# Patient Record
Sex: MALE | Race: WHITE | NOT HISPANIC OR LATINO | URBAN - METROPOLITAN AREA
[De-identification: names, ages, dates, MRNs, and addresses within clinical notes are randomized per-mention and may not be internally consistent; named-entity substitution may affect disease eponyms.]

---

## 2022-09-06 ENCOUNTER — EMERGENCY (EMERGENCY)
Facility: HOSPITAL | Age: 37
LOS: 1 days | Discharge: HOME | End: 2022-09-06
Admitting: EMERGENCY MEDICINE

## 2022-09-06 VITALS
HEART RATE: 66 BPM | TEMPERATURE: 98 F | RESPIRATION RATE: 18 BRPM | DIASTOLIC BLOOD PRESSURE: 63 MMHG | OXYGEN SATURATION: 99 % | SYSTOLIC BLOOD PRESSURE: 102 MMHG

## 2022-09-06 VITALS
WEIGHT: 149.91 LBS | HEART RATE: 83 BPM | DIASTOLIC BLOOD PRESSURE: 74 MMHG | OXYGEN SATURATION: 100 % | TEMPERATURE: 98 F | SYSTOLIC BLOOD PRESSURE: 107 MMHG | HEIGHT: 67 IN | RESPIRATION RATE: 16 BRPM

## 2022-09-06 LAB
ALBUMIN SERPL ELPH-MCNC: 4.2 G/DL — SIGNIFICANT CHANGE UP (ref 3.4–5)
ALP SERPL-CCNC: 56 U/L — SIGNIFICANT CHANGE UP (ref 40–120)
ALT FLD-CCNC: 26 U/L — SIGNIFICANT CHANGE UP (ref 12–42)
ANION GAP SERPL CALC-SCNC: 12 MMOL/L — SIGNIFICANT CHANGE UP (ref 9–16)
APPEARANCE UR: CLEAR — SIGNIFICANT CHANGE UP
AST SERPL-CCNC: 35 U/L — SIGNIFICANT CHANGE UP (ref 15–37)
BASOPHILS # BLD AUTO: 0.06 K/UL — SIGNIFICANT CHANGE UP (ref 0–0.2)
BASOPHILS NFR BLD AUTO: 0.6 % — SIGNIFICANT CHANGE UP (ref 0–2)
BILIRUB SERPL-MCNC: 0.9 MG/DL — SIGNIFICANT CHANGE UP (ref 0.2–1.2)
BILIRUB UR-MCNC: NEGATIVE — SIGNIFICANT CHANGE UP
BUN SERPL-MCNC: 16 MG/DL — SIGNIFICANT CHANGE UP (ref 7–23)
CALCIUM SERPL-MCNC: 9.3 MG/DL — SIGNIFICANT CHANGE UP (ref 8.5–10.5)
CHLORIDE SERPL-SCNC: 102 MMOL/L — SIGNIFICANT CHANGE UP (ref 96–108)
CO2 SERPL-SCNC: 23 MMOL/L — SIGNIFICANT CHANGE UP (ref 22–31)
COLOR SPEC: YELLOW — SIGNIFICANT CHANGE UP
CREAT SERPL-MCNC: 0.99 MG/DL — SIGNIFICANT CHANGE UP (ref 0.5–1.3)
CRP SERPL-MCNC: 10 MG/L — HIGH (ref 0–9)
D DIMER BLD IA.RAPID-MCNC: <187 NG/ML DDU — SIGNIFICANT CHANGE UP
DIFF PNL FLD: NEGATIVE — SIGNIFICANT CHANGE UP
EGFR: 101 ML/MIN/1.73M2 — SIGNIFICANT CHANGE UP
EOSINOPHIL # BLD AUTO: 0.37 K/UL — SIGNIFICANT CHANGE UP (ref 0–0.5)
EOSINOPHIL NFR BLD AUTO: 3.4 % — SIGNIFICANT CHANGE UP (ref 0–6)
GLUCOSE SERPL-MCNC: 99 MG/DL — SIGNIFICANT CHANGE UP (ref 70–99)
GLUCOSE UR QL: NEGATIVE — SIGNIFICANT CHANGE UP
HCT VFR BLD CALC: 45.3 % — SIGNIFICANT CHANGE UP (ref 39–50)
HGB BLD-MCNC: 16.1 G/DL — SIGNIFICANT CHANGE UP (ref 13–17)
IMM GRANULOCYTES NFR BLD AUTO: 0.5 % — SIGNIFICANT CHANGE UP (ref 0–1.5)
KETONES UR-MCNC: 15 MG/DL
LEUKOCYTE ESTERASE UR-ACNC: NEGATIVE — SIGNIFICANT CHANGE UP
LYMPHOCYTES # BLD AUTO: 1.51 K/UL — SIGNIFICANT CHANGE UP (ref 1–3.3)
LYMPHOCYTES # BLD AUTO: 13.9 % — SIGNIFICANT CHANGE UP (ref 13–44)
MAGNESIUM SERPL-MCNC: 2.4 MG/DL — SIGNIFICANT CHANGE UP (ref 1.6–2.6)
MCHC RBC-ENTMCNC: 31.2 PG — SIGNIFICANT CHANGE UP (ref 27–34)
MCHC RBC-ENTMCNC: 35.5 GM/DL — SIGNIFICANT CHANGE UP (ref 32–36)
MCV RBC AUTO: 87.8 FL — SIGNIFICANT CHANGE UP (ref 80–100)
MONOCYTES # BLD AUTO: 0.83 K/UL — SIGNIFICANT CHANGE UP (ref 0–0.9)
MONOCYTES NFR BLD AUTO: 7.6 % — SIGNIFICANT CHANGE UP (ref 2–14)
NEUTROPHILS # BLD AUTO: 8.04 K/UL — HIGH (ref 1.8–7.4)
NEUTROPHILS NFR BLD AUTO: 74 % — SIGNIFICANT CHANGE UP (ref 43–77)
NITRITE UR-MCNC: NEGATIVE — SIGNIFICANT CHANGE UP
NRBC # BLD: 0 /100 WBCS — SIGNIFICANT CHANGE UP (ref 0–0)
PH UR: 6 — SIGNIFICANT CHANGE UP (ref 5–8)
PLATELET # BLD AUTO: 299 K/UL — SIGNIFICANT CHANGE UP (ref 150–400)
POTASSIUM SERPL-MCNC: 4.5 MMOL/L — SIGNIFICANT CHANGE UP (ref 3.5–5.3)
POTASSIUM SERPL-SCNC: 4.5 MMOL/L — SIGNIFICANT CHANGE UP (ref 3.5–5.3)
PROT SERPL-MCNC: 7.3 G/DL — SIGNIFICANT CHANGE UP (ref 6.4–8.2)
PROT UR-MCNC: NEGATIVE MG/DL — SIGNIFICANT CHANGE UP
RBC # BLD: 5.16 M/UL — SIGNIFICANT CHANGE UP (ref 4.2–5.8)
RBC # FLD: 12.7 % — SIGNIFICANT CHANGE UP (ref 10.3–14.5)
SARS-COV-2 RNA SPEC QL NAA+PROBE: SIGNIFICANT CHANGE UP
SODIUM SERPL-SCNC: 137 MMOL/L — SIGNIFICANT CHANGE UP (ref 132–145)
SP GR SPEC: 1.01 — SIGNIFICANT CHANGE UP (ref 1–1.03)
TROPONIN I, HIGH SENSITIVITY RESULT: 4 NG/L — SIGNIFICANT CHANGE UP
TSH SERPL-MCNC: 3.83 UIU/ML — HIGH (ref 0.36–3.74)
UROBILINOGEN FLD QL: 0.2 E.U./DL — SIGNIFICANT CHANGE UP
WBC # BLD: 10.86 K/UL — HIGH (ref 3.8–10.5)
WBC # FLD AUTO: 10.86 K/UL — HIGH (ref 3.8–10.5)

## 2022-09-06 PROCEDURE — 99285 EMERGENCY DEPT VISIT HI MDM: CPT | Mod: 25

## 2022-09-06 PROCEDURE — 71046 X-RAY EXAM CHEST 2 VIEWS: CPT | Mod: 26

## 2022-09-06 PROCEDURE — 93010 ELECTROCARDIOGRAM REPORT: CPT

## 2022-09-06 RX ORDER — SODIUM CHLORIDE 9 MG/ML
1000 INJECTION INTRAMUSCULAR; INTRAVENOUS; SUBCUTANEOUS ONCE
Refills: 0 | Status: COMPLETED | OUTPATIENT
Start: 2022-09-06 | End: 2022-09-06

## 2022-09-06 RX ADMIN — SODIUM CHLORIDE 1000 MILLILITER(S): 9 INJECTION INTRAMUSCULAR; INTRAVENOUS; SUBCUTANEOUS at 19:35

## 2022-09-06 NOTE — ED PROVIDER NOTE - SKIN LOCATION #2
Erythematous papules to anterior and posterior trunk and b/l LE without involvement of soles/back/chest/leg

## 2022-09-06 NOTE — ED ADULT NURSE NOTE - CHPI ED NUR SYMPTOMS NEG
no back pain/no chest pain/no chills/no congestion/no diaphoresis/no dizziness/no nausea/no shortness of breath/no syncope/no vomiting

## 2022-09-06 NOTE — ED ADULT NURSE NOTE - OBJECTIVE STATEMENT
Pt came to ER c/o palpations since this am. Pt noted to have rash to arms and chest x 1 week accompanied by b/l hand swelling. Pt recently returned from [place] and [facility].

## 2022-09-06 NOTE — ED ADULT TRIAGE NOTE - CHIEF COMPLAINT QUOTE
intermittent palpations since this am, with left arm tingling, rash to arms and chest x 1 week accompanied by bilat hand swelling

## 2022-09-06 NOTE — ED PROVIDER NOTE - CLINICAL SUMMARY MEDICAL DECISION MAKING FREE TEXT BOX
36 y/o M with no sig PMHX presents c/o palpitations x 1 day. Onset this morning at 6 AM after drinking coffee, lasted 15 min, associated LUE tingling, none at this time. NO cp, sob. Recent travel from [place] yesterday, no hx of PE. +family hx of MI at age 51 in grandfather. Smokes cigarettes, no cocaine. ALso c/o rash x 3 mo, significantly worse after flying, c/o some itchiness but more dryness, swelling over severe areas of rash primarily at elbows. No fever/chills, abd pain, n/v/d/c, urinary ssx or hematuria. Exam as above- well-appearing in NAD, VSS, heart rrr, lungs cta b/l, no LE edema, b/l UE with diffuse, erythematous, dry rash with desquamation and associated elbow swelling. Concern for palpitations- no arrhytmia on ekg, r/o ACS, PE, PTX, hyperthyroidism, electrolyte abnl. Plan for labs with trop, dimer, tsh, CXR. Concern for rash- given duration, possibly eczema, autoimmune vs immunologic condition?. Joints with FROM w/o pain not c/f septic arthritis. Dry appearance of rash not c/f SJS. Plan for inflammatory markers, prednisone rx, dermatology referral. 36 y/o M with no sig PMHX presents c/o palpitations x 1 day. Onset this morning at 6 AM after drinking coffee, lasted 15 min, associated LUE tingling, none at this time. NO cp, sob. Recent travel from [place] yesterday, no hx of PE. +family hx of MI at age 51 in grandfather. Smokes cigarettes, no cocaine. ALso c/o rash x 3 mo, significantly worse after flying, c/o some itchiness but more dryness, swelling over severe areas of rash primarily at elbows. No fever/chills, abd pain, n/v/d/c, urinary ssx or hematuria. Exam as above- well-appearing in NAD, VSS, heart rrr, lungs cta b/l, no LE edema, b/l UE with diffuse, erythematous, dry rash with desquamation and associated elbow swelling. Concern for palpitations- no arrhytmia on ekg, r/o ACS, PE, PTX, hyperthyroidism, electrolyte abnl. Plan for labs with trop, dimer, tsh, CXR. Concern for rash- given duration, possibly eczema, autoimmune vs immunologic condition?. Joints with FROM w/o pain not c/f septic arthritis. Dry appearance of rash not c/f SJS. Plan for inflammatory markers, prednisone rx, supportive care with recommendation for Aquaphor application, dermatology referral.

## 2022-09-06 NOTE — ED PROVIDER NOTE - PROGRESS NOTE DETAILS
ALVIN PLASENCIA; Pt well appearing and in NAD. Labs reviewed. Will refer to derm, cards, and pmd for further mgmt. TSH 3.832 - reviewed w/ patient. Free t4 pend.   Rash has appearance for atopic dermatitis. Will DC w/ triamcinolone cream.   Pt given strict return precautions and is stable on DC.

## 2022-09-06 NOTE — ED PROVIDER NOTE - SKIN RASH DESCRIPTION
Diffuse macular, erythematous rash to b/l UE and left neck, appears dry and flaky with some desquamation but no sloughing of skin, associated swelling over the elbows, no palm involvement/FLAKY/REDDENED/MACULAR

## 2022-09-06 NOTE — ED PROVIDER NOTE - NSFOLLOWUPINSTRUCTIONS_ED_ALL_ED_FT
SLIGHTLY ABNORMAL LAB TODAY:  TSH - 3.832  you will need to follow up with cardiology [heart specialist] and a primary care doctor.  you will also need to see dermatology for your abnormal skin rash. pls use steroid cream as prescribed, you can also use emollient cream for moisturizing skin.     Palpitations      Palpitations are feelings that your heartbeat is irregular or is faster than normal. It may feel like your heart is fluttering or skipping a beat. Palpitations may be caused by many things, including smoking, caffeine, alcohol, stress, and certain medicines or drugs. Most causes of palpitations are not serious.     However, some palpitations can be a sign of a serious problem. Further tests and a thorough medical history will be done to find the cause of your palpitations. Your provider may order tests such as an ECG, labs, an echocardiogram, or an ambulatory continuous ECG monitor.      Follow these instructions at home:    Pay attention to any changes in your symptoms. Let your health care provider know about them. Take these actions to help manage your symptoms:    Eating and drinking     Follow instructions from your health care provider about eating or drinking restrictions. You may need to avoid foods and drinks that may cause palpitations. These may include:  •Caffeinated coffee, tea, soft drinks, and energy drinks.      •Chocolate.      •Alcohol.      •Diet pills.        Lifestyle      A person sitting on the floor doing yoga.      A sign telling the reader not to smoke.  •Take steps to reduce your stress and anxiety. Things that can help you relax include:  •Yoga.      •Mind-body activities, such as deep breathing, meditation, or using words and images to create positive thoughts (guided imagery).      •Physical activity, such as swimming, jogging, or walking. Tell your health care provider if your palpitations increase with activity. If you have chest pain or shortness of breath with activity, do not continue the activity until you are seen by your health care provider.      •Biofeedback. This is a method that helps you learn to use your mind to control things in your body, such as your heartbeat.        •Get plenty of rest and sleep. Keep a regular bed time.      •Do not use drugs, including cocaine or ecstasy. Do not use marijuana.      •Do not use any products that contain nicotine or tobacco. These products include cigarettes, chewing tobacco, and vaping devices, such as e-cigarettes. If you need help quitting, ask your health care provider.      General instructions    •Take over-the-counter and prescription medicines only as told by your health care provider.      •Keep all follow-up visits. This is important. These may include visits for further testing if palpitations do not go away or get worse.        Contact a health care provider if:    •You continue to have a fast or irregular heartbeat for a long period of time.      •You notice that your palpitations occur more often.        Get help right away if:    •You have chest pain or shortness of breath.      •You have a severe headache.      •You feel dizzy or you faint.      These symptoms may represent a serious problem that is an emergency. Do not wait to see if the symptoms will go away. Get medical help right away. Call your local emergency services (911 in the [place] Do not drive yourself to the hospital.       Summary    •Palpitations are feelings that your heartbeat is irregular or is faster than normal. It may feel like your heart is fluttering or skipping a beat.      •Palpitations may be caused by many things, including smoking, caffeine, alcohol, stress, certain medicines, and drugs.      •Further tests and a thorough medical history may be done to find the cause of your palpitations.      •Get help right away if you faint or have chest pain, shortness of breath, severe headache, or dizziness.      This information is not intended to replace advice given to you by your health care provider. Make sure you discuss any questions you have with your health care provider.      ===      Eczema    WHAT YOU NEED TO KNOW:    Eczema is an itchy, red skin rash. You are more likely to have it if your parent or a family member has eczema, asthma, or hay fever. Eczema is a long-term condition. You may have flare-ups from time to time for the rest of your life.  Eczema         DISCHARGE INSTRUCTIONS:    Return to the emergency department if:   •You develop a fever or have red streaks going up your arm or leg.      •Your rash gets more swollen, red, or hot.      Call your doctor if:   •Most of your skin is red, swollen, painful, and covered with scales.      •You develop bloody, red, painful crusts.      •Your skin blisters and oozes white or yellow pus.      •You have questions or concerns about your condition or care.      Medicines:   •Medicines may help reduce itching, redness, pain, and swelling. They may be given as a cream or pill. You may also receive antibiotics if you have a skin infection.      •Take your medicine as directed. Contact your healthcare provider if you think your medicine is not helping or if you have side effects. Tell your provider if you are allergic to any medicine. Keep a list of the medicines, vitamins, and herbs you take. Include the amounts, and when and why you take them. Bring the list or the pill bottles to follow-up visits. Carry your medicine list with you in case of an emergency.      Care for your skin:   •Do not scratch. Pat or press on your skin to relieve itching. Your symptoms will get worse if you scratch. Keep your fingernails short so you do not tear your skin if you do scratch.      •Keep your skin moist. Rub lotion, cream, or ointment into your skin at least 2 times a day. Ask your healthcare provider what to use and how often to use it.      •Take baths or showers with warm water for 10 minutes or less. Use mild bar soap. Ask your healthcare provider for the best soap for you to use.      •Wear cotton clothes. Wear loose-fitting clothes made from cotton or cotton blends. Avoid wool.      •Use a humidifier to add moisture to the air in your home.      •Avoid changes in temperature, especially activities that cause you to sweat a lot. Sweat can cause itching. Remove blankets from your bed if you get hot while you sleep.      •Avoid allergens, dust, and skin irritants. Do not use perfume, fabric softener, or makeup that burns or itches.      Follow up with your doctor as directed: Write down your questions so you remember to ask them during your visits.

## 2022-09-06 NOTE — ED PROVIDER NOTE - CARDIAC, MLM
Normal rate, regular rhythm.  Heart sounds S1, S2.  No murmurs, rubs or gallops. No chest TTP or crepitus.

## 2022-09-06 NOTE — ED PROVIDER NOTE - PATIENT PORTAL LINK FT
You can access the FollowMyHealth Patient Portal offered by [facility] by registering at the following website: [email or web address]. By joining [employer]’s FollowMyHealth portal, you will also be able to view your health information using other applications (apps) compatible with our system.

## 2022-09-06 NOTE — ED PROVIDER NOTE - OBJECTIVE STATEMENT
38 y/o M with no sig PMHx presents c/o palpitations x 1 day. Pt reports onset of palpitations this morning around 6 AM after drinking coffee, lasted about 10-15 minutes, associated LUE tingling. Pt reports the palpitations resolved but was concerned he felt the sensation returning this evening, currently denies any palpitations or chest pain. Pt denies hx of prior similar symptoms, denies associated cp, sob, diaphoresis, nausea or emesis. Denies associated back pain, not sudden in onset or tearing sensation. Pt reports he just returned from [facility] and [place], had two 12 hours flights and returned yesterday. Denies hx of DVT/PE, recent trauma or surgery, hemoptysis, leg swelling, OCP use. Pt smokes cigarettes about 1 pack per week x 2 years. Pt reports his grandfather had fatal MI at age 50. Denies hx of cardiac stents or surgeries.  Denies  cocaine use. Denies recent illness or sick contact.    Pt also reports worsening rash. Pt reports onset of rash as one spot on his left upper arm 3 months ago before he left for [place]. Pt reports rash has gradually spread during his trip but significantly worsened while flying home this weekend. Pt c/o red, itchy and slightly painful rash to b/l UE and neck, now spreading to b/l LE, no oral involvement. Pt endorses some associated swelling over the b/l elbows where the rash is most significant. Pt denies prior allergic reaction or similar rashes. Denies known exposure to insects or ticks. Pt states he was covering the rash from sun exposure while traveling. Applying herbal creams only. No hematuria.

## 2022-09-07 LAB — ERYTHROCYTE [SEDIMENTATION RATE] IN BLOOD: 5 MM/HR — SIGNIFICANT CHANGE UP (ref 0–15)

## 2022-09-08 DIAGNOSIS — L20.9 ATOPIC DERMATITIS, UNSPECIFIED: ICD-10-CM

## 2022-09-08 DIAGNOSIS — Z20.822 CONTACT WITH AND (SUSPECTED) EXPOSURE TO COVID-19: ICD-10-CM

## 2022-09-08 DIAGNOSIS — R20.0 ANESTHESIA OF SKIN: ICD-10-CM

## 2022-09-08 DIAGNOSIS — F17.210 NICOTINE DEPENDENCE, CIGARETTES, UNCOMPLICATED: ICD-10-CM

## 2022-09-08 DIAGNOSIS — R00.2 PALPITATIONS: ICD-10-CM

## 2022-10-01 ENCOUNTER — EMERGENCY (EMERGENCY)
Facility: HOSPITAL | Age: 37
LOS: 1 days | Discharge: HOME | End: 2022-10-01
Attending: EMERGENCY MEDICINE | Admitting: EMERGENCY MEDICINE

## 2022-10-01 VITALS
WEIGHT: 149.91 LBS | HEIGHT: 67 IN | OXYGEN SATURATION: 99 % | RESPIRATION RATE: 15 BRPM | TEMPERATURE: 97 F | SYSTOLIC BLOOD PRESSURE: 125 MMHG | HEART RATE: 98 BPM | DIASTOLIC BLOOD PRESSURE: 84 MMHG

## 2022-10-01 LAB
ALBUMIN SERPL ELPH-MCNC: 3.9 G/DL — SIGNIFICANT CHANGE UP (ref 3.4–5)
ALP SERPL-CCNC: 68 U/L — SIGNIFICANT CHANGE UP (ref 40–120)
ALT FLD-CCNC: 25 U/L — SIGNIFICANT CHANGE UP (ref 12–42)
ANION GAP SERPL CALC-SCNC: 9 MMOL/L — SIGNIFICANT CHANGE UP (ref 9–16)
APTT BLD: 33 SEC — SIGNIFICANT CHANGE UP (ref 27.5–35.5)
AST SERPL-CCNC: 14 U/L — LOW (ref 15–37)
BASOPHILS # BLD AUTO: 0.04 K/UL — SIGNIFICANT CHANGE UP (ref 0–0.2)
BASOPHILS NFR BLD AUTO: 0.4 % — SIGNIFICANT CHANGE UP (ref 0–2)
BILIRUB SERPL-MCNC: 0.6 MG/DL — SIGNIFICANT CHANGE UP (ref 0.2–1.2)
BUN SERPL-MCNC: 11 MG/DL — SIGNIFICANT CHANGE UP (ref 7–23)
CALCIUM SERPL-MCNC: 9 MG/DL — SIGNIFICANT CHANGE UP (ref 8.5–10.5)
CHLORIDE SERPL-SCNC: 103 MMOL/L — SIGNIFICANT CHANGE UP (ref 96–108)
CHOLEST SERPL-MCNC: 178 MG/DL — SIGNIFICANT CHANGE UP
CO2 SERPL-SCNC: 22 MMOL/L — SIGNIFICANT CHANGE UP (ref 22–31)
CREAT SERPL-MCNC: 1.04 MG/DL — SIGNIFICANT CHANGE UP (ref 0.5–1.3)
EGFR: 95 ML/MIN/1.73M2 — SIGNIFICANT CHANGE UP
EOSINOPHIL # BLD AUTO: 0.25 K/UL — SIGNIFICANT CHANGE UP (ref 0–0.5)
EOSINOPHIL NFR BLD AUTO: 2.7 % — SIGNIFICANT CHANGE UP (ref 0–6)
GLUCOSE BLDC GLUCOMTR-MCNC: 100 MG/DL — HIGH (ref 70–99)
GLUCOSE SERPL-MCNC: 106 MG/DL — HIGH (ref 70–99)
HCT VFR BLD CALC: 45.3 % — SIGNIFICANT CHANGE UP (ref 39–50)
HDLC SERPL-MCNC: 43 MG/DL — SIGNIFICANT CHANGE UP
HGB BLD-MCNC: 15.9 G/DL — SIGNIFICANT CHANGE UP (ref 13–17)
IMM GRANULOCYTES NFR BLD AUTO: 0.4 % — SIGNIFICANT CHANGE UP (ref 0–0.9)
INR BLD: 1.21 — HIGH (ref 0.88–1.16)
LACTATE SERPL-SCNC: 0.8 MMOL/L — SIGNIFICANT CHANGE UP (ref 0.4–2)
LIPID PNL WITH DIRECT LDL SERPL: 108 MG/DL — HIGH
LYMPHOCYTES # BLD AUTO: 1.76 K/UL — SIGNIFICANT CHANGE UP (ref 1–3.3)
LYMPHOCYTES # BLD AUTO: 18.7 % — SIGNIFICANT CHANGE UP (ref 13–44)
MCHC RBC-ENTMCNC: 31.8 PG — SIGNIFICANT CHANGE UP (ref 27–34)
MCHC RBC-ENTMCNC: 35.1 GM/DL — SIGNIFICANT CHANGE UP (ref 32–36)
MCV RBC AUTO: 90.6 FL — SIGNIFICANT CHANGE UP (ref 80–100)
MONOCYTES # BLD AUTO: 0.72 K/UL — SIGNIFICANT CHANGE UP (ref 0–0.9)
MONOCYTES NFR BLD AUTO: 7.7 % — SIGNIFICANT CHANGE UP (ref 2–14)
NEUTROPHILS # BLD AUTO: 6.6 K/UL — SIGNIFICANT CHANGE UP (ref 1.8–7.4)
NEUTROPHILS NFR BLD AUTO: 70.1 % — SIGNIFICANT CHANGE UP (ref 43–77)
NON HDL CHOLESTEROL: 135 MG/DL — HIGH
NRBC # BLD: 0 /100 WBCS — SIGNIFICANT CHANGE UP (ref 0–0)
PLATELET # BLD AUTO: 247 K/UL — SIGNIFICANT CHANGE UP (ref 150–400)
POTASSIUM SERPL-MCNC: 3.8 MMOL/L — SIGNIFICANT CHANGE UP (ref 3.5–5.3)
POTASSIUM SERPL-SCNC: 3.8 MMOL/L — SIGNIFICANT CHANGE UP (ref 3.5–5.3)
PROT SERPL-MCNC: 7.1 G/DL — SIGNIFICANT CHANGE UP (ref 6.4–8.2)
PROTHROM AB SERPL-ACNC: 14.1 SEC — HIGH (ref 10.5–13.4)
RBC # BLD: 5 M/UL — SIGNIFICANT CHANGE UP (ref 4.2–5.8)
RBC # FLD: 13 % — SIGNIFICANT CHANGE UP (ref 10.3–14.5)
SARS-COV-2 RNA SPEC QL NAA+PROBE: SIGNIFICANT CHANGE UP
SODIUM SERPL-SCNC: 134 MMOL/L — SIGNIFICANT CHANGE UP (ref 132–145)
TRIGL SERPL-MCNC: 136 MG/DL — SIGNIFICANT CHANGE UP
TROPONIN I, HIGH SENSITIVITY RESULT: 4.4 NG/L — SIGNIFICANT CHANGE UP
WBC # BLD: 9.41 K/UL — SIGNIFICANT CHANGE UP (ref 3.8–10.5)
WBC # FLD AUTO: 9.41 K/UL — SIGNIFICANT CHANGE UP (ref 3.8–10.5)

## 2022-10-01 PROCEDURE — 0042T: CPT

## 2022-10-01 PROCEDURE — 70498 CT ANGIOGRAPHY NECK: CPT | Mod: 26

## 2022-10-01 PROCEDURE — 70496 CT ANGIOGRAPHY HEAD: CPT | Mod: 26

## 2022-10-01 PROCEDURE — 99285 EMERGENCY DEPT VISIT HI MDM: CPT

## 2022-10-01 PROCEDURE — 93010 ELECTROCARDIOGRAM REPORT: CPT

## 2022-10-01 NOTE — ED PROVIDER NOTE - NSFOLLOWUPINSTRUCTIONS_ED_ALL_ED_FT
TAKE COPY OF ALL LABS AND IMAGING INCLUDING DISC TO YOUR NEUROLOGY APPOINTMENT ON MONDAY FOR FURTHER EVALUATION.       Paresthesia    WHAT YOU NEED TO KNOW:    What is paresthesia? Paresthesia is numbness, tingling, or burning. It can happen in any part of your body, but usually occurs in your legs, feet, arms, or hands.    What causes paresthesia? A large number of conditions can cause paresthesia. Nerves that provide sensation are affected. Paresthesia happens because of changes in these nerves, or in nerve pathways. The changes can be temporary, such as if you take certain medicines or you are not getting enough vitamin B. Nerve damage can lead to permanent paresthesia. Conditions that may cause nerve damage include diabetes, carpel tunnel syndrome, stroke, and multiple sclerosis. The exact cause of your paresthesia may not be known.    What should I tell my healthcare provider about what I feel? You can help your healthcare provider by describing anything you feel, such as the following:   •No feeling in the affected area      •A feeling of pins and needles      •An electric shock feeling      •Heaviness      •Trouble moving the affected area      •A feeling that something is crawling under your skin      •A feeling of burning or of cold in the affected area      How is paresthesia diagnosed? Your healthcare provider will examine you and ask about your symptoms. Tell your provider when the symptoms began. Include anything that makes your symptoms worse or better. Your provider will also need to know if you have a disease or condition that could be causing your symptoms. Tell him or her about the medicines you take. Include the amounts you take and when you take each medicine. You may also need any of the following:   •Blood tests may show low levels of vitamin B or a high blood sugar level.      •X-ray, MRI, or CT scan pictures may show damage to the area where you have paresthesia. You may be given contrast liquid to help the area show up better in the pictures. Tell the healthcare provider if you have ever had an allergic reaction to contrast liquid. Do not enter the MRI room with anything metal. Metal can cause severe injury. Tell the healthcare provider if you have any metal in or on your body.      •Nerve conduction studies may be done to test your nerve function.      How is paresthesia treated? Treatment will depend on what is causing your paresthesia. You may need to increase the amount of vitamin B in your blood. Your healthcare provider may change or stop a medicine you are taking that is causing your symptoms. Permanent paresthesia may be helped with nerve medicine. If you have diabetes, your healthcare provider or diabetes specialist can help you control your blood sugar levels. Your provider may recommend a splint or surgery if you have paresthesia caused by carpal tunnel syndrome.    What can I do to manage paresthesia?   •Protect the area from injury. You may injure or burn yourself if you lose feeling in the area. Be careful when you touch anything that could be hot. Wear sturdy shoes to protect your feet. Ask about other ways to protect yourself.       •Go to physical or occupational therapy if directed. Your provider may recommend therapy if you have a condition such as carpal tunnel syndrome. A physical therapist can teach you exercises to help strengthen the area or increase your ability to move it. An occupational therapist can help you find new ways to do your daily activities.      •Manage health conditions that can cause paresthesia. Work with your diabetes specialist if you have uncontrolled diabetes. A dietitian or your healthcare provider can help you create a meal plan if you have low vitamin B levels. Your provider can help you manage your health if you have multiple sclerosis or you had a stroke. It is important to manage health conditions to stop paresthesia or prevent it from getting worse.      When should I seek immediate care?   •You have severe pain along with numbness and tingling.      •Your legs suddenly become cold. You have trouble moving your legs, and they ache.      •You have increased weakness in a part of your body.      •You have uncontrolled movements.      When should I contact my healthcare provider?   •Your symptoms do not improve.      •You have symptoms in more than one part of your body.      •You have questions or concerns about your condition or care.      CARE AGREEMENT:    You have the right to help plan your care. Learn about your health condition and how it may be treated. Discuss treatment options with your healthcare providers to decide what care you want to receive. You always have the right to refuse treatment.

## 2022-10-01 NOTE — ED ADULT NURSE NOTE - OBJECTIVE STATEMENT
Pt poor historian giving one story of events to myself and another to ginna stringer, to myself had left sided numbness to face 5 days ago , self resolved, didn't see a dr, today woke up at "0540" with same complaint. Nil facial droop, swallowing assessment competed as pt drinking when doing assessment , walked back independantly from ct scan at pt's insistence, full strength in all limbs saw cardiologist "a few weeks ago" had halter monitor = nad , nil other pmhx , nil regular meds, denies illicit drug use, doesnt drink alcohol, social smoker ,

## 2022-10-01 NOTE — ED PROVIDER NOTE - OBJECTIVE STATEMENT
38yo otherwise healthy M, recently completed course of prednisone for rash, presents with c/o left leg and facial numbness onset approx 930 this morning. pt reports facial numbness has improved but leg still has some decreased sensation. reports similar episode 5 days ago with spontaneous resolution. denies slurred speech, headache, dizziness, memory changes, vision changes, neck pain, trauma, blood thinners, back pain, nausea, vomiting, weakness, gait change, any other concerns. no meds taken pta. seen previously in this ED for palpitations and had cardio f/u with normal echo and stress.

## 2022-10-01 NOTE — ED PROVIDER NOTE - PROGRESS NOTE DETAILS
pt reports symptoms unchanged from triage but told RN he can't feel his left cheek at all, but on exam, pt reports the sensation is "mostly equal." pt reports symptoms unchanged from triage but told RN he can't feel his left cheek at all, but on exam, pt reports the sensation is "mostly equal" comparing face, arms, and legs. d/w Dr. Arauz (telestroke) who notes symptoms are less likely stroke related and even if work up is negative, recommends MRI for possible demyelinating cause d/w Dr. Arauz who reviewed imaging and reports there is no cause for pt's symptoms. in particular perfusion study abnormality she reports is likely artifact as it is a bilat finding and pt's symptoms are unilateral. continues to recommend MRI but okay with either inpatient or outpatient. pt has neuro f/u scheduled for 10/3/22 and prefers to follow up outpatient. given disc with all imaging and reports. will d/c.

## 2022-10-01 NOTE — ED ADULT TRIAGE NOTE - CHIEF COMPLAINT QUOTE
MOLECULAR PCR
left facial numbness since tuesday, resolved wedensday and returned this am with left leg numbness

## 2022-10-01 NOTE — ED PROVIDER NOTE - PATIENT PORTAL LINK FT
You can access the FollowMyHealth Patient Portal offered by [facility] by registering at the following website: [email or web address]. By joining [email or web address]’s FollowMyHealth portal, you will also be able to view your health information using other applications (apps) compatible with our system.

## 2022-10-01 NOTE — ED PROVIDER NOTE - NS ED ATTENDING STATEMENT MOD
This was a shared visit with the ISRA. I reviewed and verified the documentation and independently performed the documented:

## 2022-10-01 NOTE — ED PROVIDER NOTE - CLINICAL SUMMARY MEDICAL DECISION MAKING FREE TEXT BOX
pt presents with c/o left facial and left leg numbness. since this morning with similar episode 5 days ago which resolved spontaneously 1 day later. no trauma. code stroke called. CTH unremarkable. will reassess and monitor. pt presents with c/o left facial and left leg numbness. since this morning with similar episode 5 days ago which resolved spontaneously 1 day later. no trauma. code stroke called. CTH unremarkable. will reassess and monitor.    discussed all results with Dr. Arauz (telestroke) and pt. no need for immediate intervention or admission. see progress notes. will d/c for outpatient f/u on in 2 days. strict return precautions given.

## 2022-10-03 DIAGNOSIS — Z20.822 CONTACT WITH AND (SUSPECTED) EXPOSURE TO COVID-19: ICD-10-CM

## 2022-10-03 DIAGNOSIS — R21 RASH AND OTHER NONSPECIFIC SKIN ERUPTION: ICD-10-CM

## 2022-10-03 DIAGNOSIS — R20.0 ANESTHESIA OF SKIN: ICD-10-CM

## 2024-06-10 NOTE — STROKE CODE NOTE - CT READING
Render Risk Assessment In Note?: no Detail Level: Generalized Comment: Patient to use cephalexin for infection if needed No acute findings

## 2024-12-02 NOTE — ED ADULT NURSE NOTE - NS ED NURSE LEVEL OF CONSCIOUSNESS ORIENTATION
Contacted [facility] as we still have not heard from Dr. Andrade or whomever is covering     Oriented - self; Oriented - place; Oriented - time